# Patient Record
Sex: FEMALE | Race: WHITE
[De-identification: names, ages, dates, MRNs, and addresses within clinical notes are randomized per-mention and may not be internally consistent; named-entity substitution may affect disease eponyms.]

---

## 2018-04-12 ENCOUNTER — HOSPITAL ENCOUNTER (EMERGENCY)
Dept: HOSPITAL 17 - NEPE | Age: 53
Discharge: HOME | End: 2018-04-12
Payer: COMMERCIAL

## 2018-04-12 VITALS
SYSTOLIC BLOOD PRESSURE: 175 MMHG | TEMPERATURE: 98.1 F | DIASTOLIC BLOOD PRESSURE: 97 MMHG | HEART RATE: 82 BPM | RESPIRATION RATE: 18 BRPM | OXYGEN SATURATION: 98 %

## 2018-04-12 VITALS
RESPIRATION RATE: 16 BRPM | HEART RATE: 75 BPM | OXYGEN SATURATION: 96 % | DIASTOLIC BLOOD PRESSURE: 71 MMHG | SYSTOLIC BLOOD PRESSURE: 139 MMHG

## 2018-04-12 VITALS — BODY MASS INDEX: 26.95 KG/M2 | WEIGHT: 152.12 LBS | HEIGHT: 63 IN

## 2018-04-12 DIAGNOSIS — M25.551: ICD-10-CM

## 2018-04-12 DIAGNOSIS — F17.210: ICD-10-CM

## 2018-04-12 DIAGNOSIS — R60.9: Primary | ICD-10-CM

## 2018-04-12 DIAGNOSIS — M79.604: ICD-10-CM

## 2018-04-12 LAB
BASOPHILS # BLD AUTO: 0.1 TH/MM3 (ref 0–0.2)
BASOPHILS NFR BLD: 0.8 % (ref 0–2)
BUN SERPL-MCNC: 10 MG/DL (ref 7–18)
CALCIUM SERPL-MCNC: 9.1 MG/DL (ref 8.5–10.1)
CHLORIDE SERPL-SCNC: 110 MEQ/L (ref 98–107)
COLOR UR: COLORLESS
CREAT SERPL-MCNC: 0.67 MG/DL (ref 0.5–1)
EOSINOPHIL # BLD: 0.4 TH/MM3 (ref 0–0.4)
EOSINOPHIL NFR BLD: 3.5 % (ref 0–4)
ERYTHROCYTE [DISTWIDTH] IN BLOOD BY AUTOMATED COUNT: 14.6 % (ref 11.6–17.2)
GFR SERPLBLD BASED ON 1.73 SQ M-ARVRAT: 92 ML/MIN (ref 89–?)
GLUCOSE SERPL-MCNC: 87 MG/DL (ref 74–106)
GLUCOSE UR STRIP-MCNC: (no result) MG/DL
HCO3 BLD-SCNC: 21.9 MEQ/L (ref 21–32)
HCT VFR BLD CALC: 42.8 % (ref 35–46)
HGB BLD-MCNC: 14.3 GM/DL (ref 11.6–15.3)
HGB UR QL STRIP: (no result)
KETONES UR STRIP-MCNC: (no result) MG/DL
LYMPHOCYTES # BLD AUTO: 3.4 TH/MM3 (ref 1–4.8)
LYMPHOCYTES NFR BLD AUTO: 30.7 % (ref 9–44)
MCH RBC QN AUTO: 29.3 PG (ref 27–34)
MCHC RBC AUTO-ENTMCNC: 33.5 % (ref 32–36)
MCV RBC AUTO: 87.6 FL (ref 80–100)
MONOCYTE #: 0.6 TH/MM3 (ref 0–0.9)
MONOCYTES NFR BLD: 5 % (ref 0–8)
NEUTROPHILS # BLD AUTO: 6.7 TH/MM3 (ref 1.8–7.7)
NEUTROPHILS NFR BLD AUTO: 60 % (ref 16–70)
NITRITE UR QL STRIP: (no result)
PLATELET # BLD: 256 TH/MM3 (ref 150–450)
PMV BLD AUTO: 9.1 FL (ref 7–11)
RBC # BLD AUTO: 4.88 MIL/MM3 (ref 4–5.3)
SODIUM SERPL-SCNC: 141 MEQ/L (ref 136–145)
SP GR UR STRIP: 1 (ref 1–1.03)
SQUAMOUS #/AREA URNS HPF: 2 /HPF (ref 0–5)
URINE LEUKOCYTE ESTERASE: (no result)
WBC # BLD AUTO: 11.1 TH/MM3 (ref 4–11)

## 2018-04-12 PROCEDURE — 81001 URINALYSIS AUTO W/SCOPE: CPT

## 2018-04-12 PROCEDURE — 99284 EMERGENCY DEPT VISIT MOD MDM: CPT

## 2018-04-12 PROCEDURE — 73502 X-RAY EXAM HIP UNI 2-3 VIEWS: CPT

## 2018-04-12 PROCEDURE — 93971 EXTREMITY STUDY: CPT

## 2018-04-12 PROCEDURE — 80048 BASIC METABOLIC PNL TOTAL CA: CPT

## 2018-04-12 PROCEDURE — 85025 COMPLETE CBC W/AUTO DIFF WBC: CPT

## 2018-04-12 NOTE — PD
HPI


Chief Complaint:  Hypertension


Time Seen by Provider:  20:43


Travel History


International Travel<30 days:  No


Contact w/Intl Traveler<30days:  No


Traveled to known affect area:  No





History of Present Illness


HPI


52-year-old female presents to the ED via EMS for evaluation of intermittent 

edema of bilateral lower extremities, right greater than left.  Edema is 

improved by elevating the legs, worsened by ambulation.  Also complains of 

right hip pain.  Pain is rated 6/10, worsened by weightbearing.  Patient can 

identify no acute injury to the area.  She denies fever, chills, chest pain, 

palpitations, shortness of breath, abdominal pain, dysuria.  Patient denies 

chronic medical conditions.  She endorses history of DVT in the left leg.  She 

states that she had the left vein stripped.  She is pack per day smoker since 

age 17.  She states she is adopted and has no idea of her family history.  She 

does not have primary care.





PFSH


Past Medical History


Cancer:  Yes (CERVICAL)


Deep Vein Thrombosis:  Yes (REMOVED FROM LEFT LEG)


Pregnant?:  Not Pregnant





Past Surgical History


Surgical History:  No Previous Surgery





Social History


Alcohol Use:  Yes (OCCASSIONALLY )


Tobacco Use:  Yes (1 PPD )


Substance Use:  No





Allergies-Medications


Reported Meds & Prescriptions





Reported Meds & Active Scripts


Active


Naprosyn (Naproxen) 500 Mg Tab 500 Mg PO BID 5 Days








Review of Systems


Except as stated in HPI:  all other systems reviewed are Neg





Physical Exam


Narrative


GENERAL: Well-nourished, well-developed white female no acute distress.


SKIN: Focused skin assessment warm/dry.


HEAD: Normocephalic.


EYES: No scleral icterus. No injection or drainage. 


NECK: Supple, trachea midline. No JVD or lymphadenopathy.


CARDIOVASCULAR: Regular rate and rhythm without murmurs, gallops, or rubs. 


RESPIRATORY: Breath sounds clear and equal bilaterally. No accessory muscle use.


GASTROINTESTINAL: Abdomen soft, non-tender, nondistended.  Active bowel sounds.


MUSCULOSKELETAL: No cyanosis.  


FOCUSED RIGHT LOWER EXTREMITY EXAM: 2+ DP pulse.  Homans sign positive.  Tender 

to palpation of the anterior lateral hip.  Trace edema of the right lower 

extremity, worse in the foot.  5/5 strength in all muscle groups.  Patient 

retains full, active, painless R OM.  Neurovascularly intact distally.


BACK: Nontender without obvious deformity. No CVA tenderness.





Data


Data


Last Documented VS





Vital Signs








  Date Time  Temp Pulse Resp B/P (MAP) Pulse Ox O2 Delivery O2 Flow Rate FiO2


 


4/12/18 21:41        


 


4/12/18 21:40  75 16  96 Room Air  


 


4/12/18 20:34 98.1       








Orders





 Orders


Complete Blood Count With Diff (4/12/18 20:41)


Basic Metabolic Panel (Bmp) (4/12/18 20:41)


Urinalysis - C+S If Indicated (4/12/18 20:41)


Hip, Uni(Ap&Lat) Wo Ap Pelvis (4/12/18 20:41)


Us Leg Venous Doppler (4/12/18 20:44)


Ed Discharge Order (4/12/18 21:37)





Labs





Laboratory Tests








Test


  4/12/18


20:50


 


White Blood Count 11.1 TH/MM3 


 


Red Blood Count 4.88 MIL/MM3 


 


Hemoglobin 14.3 GM/DL 


 


Hematocrit 42.8 % 


 


Mean Corpuscular Volume 87.6 FL 


 


Mean Corpuscular Hemoglobin 29.3 PG 


 


Mean Corpuscular Hemoglobin


Concent 33.5 % 


 


 


Red Cell Distribution Width 14.6 % 


 


Platelet Count 256 TH/MM3 


 


Mean Platelet Volume 9.1 FL 


 


Neutrophils (%) (Auto) 60.0 % 


 


Lymphocytes (%) (Auto) 30.7 % 


 


Monocytes (%) (Auto) 5.0 % 


 


Eosinophils (%) (Auto) 3.5 % 


 


Basophils (%) (Auto) 0.8 % 


 


Neutrophils # (Auto) 6.7 TH/MM3 


 


Lymphocytes # (Auto) 3.4 TH/MM3 


 


Monocytes # (Auto) 0.6 TH/MM3 


 


Eosinophils # (Auto) 0.4 TH/MM3 


 


Basophils # (Auto) 0.1 TH/MM3 


 


CBC Comment DIFF FINAL 


 


Differential Comment  


 


Urine Color COLORLESS 


 


Urine Turbidity CLEAR 


 


Urine pH 5.5 


 


Urine Specific Gravity 1.001 


 


Urine Protein NEG mg/dL 


 


Urine Glucose (UA) NEG mg/dL 


 


Urine Ketones NEG mg/dL 


 


Urine Occult Blood NEG 


 


Urine Nitrite NEG 


 


Urine Bilirubin NEG 


 


Urine Urobilinogen


  LESS THAN 2.0


MG/DL


 


Urine Leukocyte Esterase NEG 


 


Urine RBC


  LESS THAN 1


/hpf


 


Urine WBC


  LESS THAN 1


/hpf


 


Urine Squamous Epithelial


Cells 2 /hpf 


 


 


Microscopic Urinalysis Comment


  CULT NOT


INDICATED


 


Blood Urea Nitrogen 10 MG/DL 


 


Creatinine 0.67 MG/DL 


 


Random Glucose 87 MG/DL 


 


Calcium Level 9.1 MG/DL 


 


Sodium Level 141 MEQ/L 


 


Potassium Level 4.1 MEQ/L 


 


Chloride Level 110 MEQ/L 


 


Carbon Dioxide Level 21.9 MEQ/L 


 


Anion Gap 9 MEQ/L 


 


Estimat Glomerular Filtration


Rate 92 ML/MIN 


 











[facility]


Medical Decision Making


Medical Screen Exam Complete:  Yes


Emergency Medical Condition:  Yes


Differential Diagnosis


Dependent edema versus DVT versus osteoarthritis versus bursitis versus other


Narrative Course


52-year-old female presents to the ED via EMS for evaluation of intermittent 

edema of bilateral lower extremities, right greater than left.  Edema is 

improved by elevating the legs, worsened by ambulation.  Also complains of 

right hip pain.  Pain is rated 6/10, worsened by weightbearing.  Patient can 

identify no acute injury to the area. She endorses history of DVT in the left 

leg.  She states that she had the left vein stripped.  She is pack per day 

smoker since age 17.  She states she is adopted and has no idea of her family 

history.  Patient hyper intensive on presentation.  Physical exam reveals trace 

edema in the bilateral lower extremities, worse in the right.  She also has 

some tenderness to palpation of the anterior lateral right hip.  X-ray of the 

hip reveals no acute bony injury per radiology read.  Ultrasound of the right 

lower extremity reveals no evidence of DVT.  No concerning abnormalities a CBC, 

CMP, UA.  Suspect dependent edema, possible bursitis of the right hip.  Patient'

s prescribed a short course of anti-inflammatories, instructed to follow-up 

with the [facility] and the orthopedist.  I discussed the results the workup 

with the patient.  She is agreeable to care plan.  The patient stable 

discharged home.





Diagnosis





 Primary Impression:  


 Dependent edema


 Additional Impression:  


 Right hip pain


Referrals:  


[facility]





Orthopedist





***Additional Instructions:  


Rest, hydrate.


Resume normal, gentle activities as tolerated.


Take anti-inflammatories as prescribed.


Wear compression stockings when performing daily activities.


Elevate the legs as possible.


Follow-up with the [facility] to establish primary care, have your BP 

evaluated.


Follow-up with the orthopedist regarding chronic hip pain.


Return to the ED for worsening symptoms or any urgent or emergent medical 

condition.


Scripts


Naproxen (Naprosyn) 500 Mg Tab


500 MG PO BID for 5 Days, #10 TAB 0 Refills


   Prov: Carlos Bass MD         4/12/18


Disposition:  01 DISCHARGE HOME


Condition:  Stable











Kamila Gannon Apr 12, 2018 20:44

## 2018-04-12 NOTE — RADRPT
EXAM DATE/TIME:  04/12/2018 21:01 

 

[facility] COMPARISON:     

No previous studies available for comparison.

        

 

 

INDICATIONS :                

Right leg swelling and pain.

            

 

MEDICAL HISTORY :     

Deep venous thrombosis.      Cervical cancer.

 

SURGICAL HISTORY :         

Left leg vein stripping. Cervical laser surgery.

 

ENCOUNTER:     

Initial

 

ACUITY:     

1 day

 

PAIN SCORE:      

1/10

 

LOCATION:      

Right  leg.

            

                      

 

TECHNIQUE:     

Venous ultrasound of the leg was performed from the inguinal ligament to the proximal calf.  Real-dion
e, color Doppler and spectral tracing, compression and augmentation techniques were used.  

 

FINDINGS:     

There is normal compressibility of the deep venous system from the inguinal region to the proximal ca
lf.  No echogenic clot is seen in the lumen of the common femoral, femoral, popliteal, and posterior 
tibial veins.  There is a normal response of the venous system to proximal and distal augmentation an
d respiration.  

 

CONCLUSION:     

Negative exam with no evidence of deep venous thrombosis. 

 

 

 Elmo Xiong MD on April 12, 2018 at 21:26           

Board Certified Radiologist.

 This report was verified electronically.

## 2023-03-28 NOTE — RADRPT
EXAM DATE/TIME:  04/12/2018 20:58 

 

[facility] COMPARISON:     

No previous studies available for comparison.

 

                     

INDICATIONS :     

Right hip pain with no known injury.

                     

 

MEDICAL HISTORY :     

None.          

 

SURGICAL HISTORY :     

None.   

 

ENCOUNTER:     

Initial                                        

 

ACUITY:     

4 - 6 days      

 

PAIN SCORE:     

10/10

 

LOCATION:     

Right  hip.

 

FINDINGS:     

A two view examination of the right hip was performed.  The primary and secondary trabecular pattern 
of the femoral neck is intact.  The hip joint is of normal width without significant sclerosis or bon
y hypertrophy.  The acetabulum is grossly intact.

 

CONCLUSION:     Unremarkable exam. 

 

 

 Elmo Xiong MD on April 12, 2018 at 21:04           

Board Certified Radiologist.

 This report was verified electronically. 28-Mar-2023 14:11